# Patient Record
Sex: MALE | Race: WHITE | NOT HISPANIC OR LATINO | Employment: FULL TIME | URBAN - METROPOLITAN AREA
[De-identification: names, ages, dates, MRNs, and addresses within clinical notes are randomized per-mention and may not be internally consistent; named-entity substitution may affect disease eponyms.]

---

## 2017-01-25 ENCOUNTER — TELEPHONE (OUTPATIENT)
Dept: FAMILY MEDICINE | Age: 32
End: 2017-01-25

## 2017-01-25 ENCOUNTER — IMAGING SERVICES (OUTPATIENT)
Dept: GENERAL RADIOLOGY | Age: 32
End: 2017-01-25
Attending: FAMILY MEDICINE

## 2017-01-25 ENCOUNTER — OFFICE VISIT (OUTPATIENT)
Dept: FAMILY MEDICINE | Age: 32
End: 2017-01-25

## 2017-01-25 VITALS
SYSTOLIC BLOOD PRESSURE: 110 MMHG | TEMPERATURE: 97.7 F | BODY MASS INDEX: 30.52 KG/M2 | DIASTOLIC BLOOD PRESSURE: 80 MMHG | HEART RATE: 84 BPM | HEIGHT: 73 IN | WEIGHT: 230.3 LBS

## 2017-01-25 DIAGNOSIS — Z23 NEED FOR VACCINATION: ICD-10-CM

## 2017-01-25 DIAGNOSIS — R10.9 ABDOMINAL DISCOMFORT: Primary | ICD-10-CM

## 2017-01-25 DIAGNOSIS — R10.9 ABDOMINAL DISCOMFORT: ICD-10-CM

## 2017-01-25 PROCEDURE — 90688 IIV4 VACCINE SPLT 0.5 ML IM: CPT | Performed by: FAMILY MEDICINE

## 2017-01-25 PROCEDURE — 99214 OFFICE O/P EST MOD 30 MIN: CPT | Performed by: FAMILY MEDICINE

## 2017-01-25 PROCEDURE — 74000 XR ABDOMEN 1 VW KUB SUPINE: CPT | Performed by: RADIOLOGY

## 2017-01-25 PROCEDURE — 90471 IMMUNIZATION ADMIN: CPT | Performed by: FAMILY MEDICINE

## 2017-01-25 RX ORDER — PANTOPRAZOLE SODIUM 40 MG/1
40 TABLET, DELAYED RELEASE ORAL DAILY
Qty: 30 TABLET | Refills: 0 | Status: SHIPPED | OUTPATIENT
Start: 2017-01-25

## 2017-01-25 RX ORDER — POLYETHYLENE GLYCOL 3350 17 G/17G
17 POWDER, FOR SOLUTION ORAL DAILY
Qty: 527 G | Refills: 1 | Status: SHIPPED | OUTPATIENT
Start: 2017-01-25

## 2020-09-06 ENCOUNTER — APPOINTMENT (OUTPATIENT)
Dept: LAB | Facility: CLINIC | Age: 35
End: 2020-09-06
Payer: COMMERCIAL

## 2022-12-24 ENCOUNTER — HEALTH MAINTENANCE LETTER (OUTPATIENT)
Age: 37
End: 2022-12-24

## 2023-04-15 ENCOUNTER — HEALTH MAINTENANCE LETTER (OUTPATIENT)
Age: 38
End: 2023-04-15

## 2023-04-20 PROBLEM — M51.26 HERNIATION OF LUMBAR INTERVERTEBRAL DISC WITHOUT MYELOPATHY: Status: ACTIVE | Noted: 2023-04-20

## 2023-04-20 RX ORDER — EMTRICITABINE AND TENOFOVIR ALAFENAMIDE 200; 25 MG/1; MG/1
1 TABLET ORAL
COMMUNITY
Start: 2020-07-14 | End: 2023-04-21

## 2023-04-20 RX ORDER — SODIUM FLUORIDE 6.1 MG/ML
PASTE, DENTIFRICE DENTAL
COMMUNITY
Start: 2022-08-31 | End: 2023-04-21

## 2023-04-21 ENCOUNTER — OFFICE VISIT (OUTPATIENT)
Dept: FAMILY MEDICINE | Facility: CLINIC | Age: 38
End: 2023-04-21
Payer: COMMERCIAL

## 2023-04-21 VITALS
HEART RATE: 57 BPM | BODY MASS INDEX: 31.7 KG/M2 | WEIGHT: 239.2 LBS | SYSTOLIC BLOOD PRESSURE: 109 MMHG | OXYGEN SATURATION: 97 % | RESPIRATION RATE: 16 BRPM | DIASTOLIC BLOOD PRESSURE: 68 MMHG | HEIGHT: 73 IN

## 2023-04-21 DIAGNOSIS — Z76.89 ENCOUNTER TO ESTABLISH CARE: ICD-10-CM

## 2023-04-21 DIAGNOSIS — N50.89 MASS OF LEFT TESTICLE: ICD-10-CM

## 2023-04-21 DIAGNOSIS — E80.4 GILBERT SYNDROME: ICD-10-CM

## 2023-04-21 DIAGNOSIS — R53.83 FATIGUE, UNSPECIFIED TYPE: ICD-10-CM

## 2023-04-21 DIAGNOSIS — Z13.220 SCREENING FOR LIPID DISORDERS: ICD-10-CM

## 2023-04-21 DIAGNOSIS — Z71.89 ADVANCE CARE PLANNING: ICD-10-CM

## 2023-04-21 DIAGNOSIS — Z00.00 ROUTINE GENERAL MEDICAL EXAMINATION AT A HEALTH CARE FACILITY: Primary | ICD-10-CM

## 2023-04-21 LAB
BILIRUB DIRECT SERPL-MCNC: 0.33 MG/DL (ref 0–0.3)
BILIRUB SERPL-MCNC: 2.1 MG/DL
CHOLEST SERPL-MCNC: 207 MG/DL
HDLC SERPL-MCNC: 41 MG/DL
LDLC SERPL CALC-MCNC: 141 MG/DL
NONHDLC SERPL-MCNC: 166 MG/DL
T4 FREE SERPL-MCNC: 1.02 NG/DL (ref 0.9–1.7)
TRIGL SERPL-MCNC: 127 MG/DL
TSH SERPL DL<=0.005 MIU/L-ACNC: 4.91 UIU/ML (ref 0.3–4.2)

## 2023-04-21 PROCEDURE — 84443 ASSAY THYROID STIM HORMONE: CPT | Performed by: STUDENT IN AN ORGANIZED HEALTH CARE EDUCATION/TRAINING PROGRAM

## 2023-04-21 PROCEDURE — 82247 BILIRUBIN TOTAL: CPT | Performed by: STUDENT IN AN ORGANIZED HEALTH CARE EDUCATION/TRAINING PROGRAM

## 2023-04-21 PROCEDURE — 80061 LIPID PANEL: CPT | Performed by: STUDENT IN AN ORGANIZED HEALTH CARE EDUCATION/TRAINING PROGRAM

## 2023-04-21 PROCEDURE — 84439 ASSAY OF FREE THYROXINE: CPT | Performed by: STUDENT IN AN ORGANIZED HEALTH CARE EDUCATION/TRAINING PROGRAM

## 2023-04-21 PROCEDURE — 36415 COLL VENOUS BLD VENIPUNCTURE: CPT | Performed by: STUDENT IN AN ORGANIZED HEALTH CARE EDUCATION/TRAINING PROGRAM

## 2023-04-21 PROCEDURE — 82306 VITAMIN D 25 HYDROXY: CPT | Performed by: STUDENT IN AN ORGANIZED HEALTH CARE EDUCATION/TRAINING PROGRAM

## 2023-04-21 PROCEDURE — 99213 OFFICE O/P EST LOW 20 MIN: CPT | Mod: 25 | Performed by: STUDENT IN AN ORGANIZED HEALTH CARE EDUCATION/TRAINING PROGRAM

## 2023-04-21 PROCEDURE — 99385 PREV VISIT NEW AGE 18-39: CPT | Performed by: STUDENT IN AN ORGANIZED HEALTH CARE EDUCATION/TRAINING PROGRAM

## 2023-04-21 PROCEDURE — 82248 BILIRUBIN DIRECT: CPT | Performed by: STUDENT IN AN ORGANIZED HEALTH CARE EDUCATION/TRAINING PROGRAM

## 2023-04-21 NOTE — PROGRESS NOTES
SUBJECTIVE:   CC: Saúl is an 38 year old who presents for preventative health visit.       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 (For example, a Health Directive, POLST, or a discussion with a medical provider or your loved ones about your wishes): no ACD, given honoring choices    Social History     Tobacco Use     Smoking status: Never     Smokeless tobacco: Never   Vaping Use     Vaping status: Never Used   Substance Use Topics     Alcohol use: No              View : No data to display.                Last PSA: No results found for: PSA    Reviewed orders with patient. Reviewed health maintenance and updated orders accordingly - Yes    Reviewed and updated as needed this visit by clinical staff   Tobacco  Allergies  Meds  Problems  Med Hx  Surg Hx  Fam Hx  Soc   Hx        Reviewed and updated as needed this visit by Provider   Tobacco  Allergies  Meds  Problems  Med Hx  Surg Hx  Fam Hx  Soc   Hx       Past Medical History:   Diagnosis Date     NO ACTIVE PROBLEMS     abstract      Past Surgical History:   Procedure Laterality Date     NO HISTORY OF SURGERY      abstract       Review of Systems  CONSTITUTIONAL: NEGATIVE for fever, chills, change in weight. POSITIVE for fatigue  INTEGUMENTARY/SKIN: NEGATIVE for worrisome rashes, moles or lesions  EYES: NEGATIVE for vision changes or irritation  ENT: NEGATIVE for ear, mouth and throat problems  RESP: NEGATIVE for significant cough or SOB  CV: NEGATIVE for chest pain, palpitations  "or peripheral edema  GI: NEGATIVE for nausea, abdominal pain, heartburn, or change in bowel habits   male: negative for dysuria, hematuria, decreased urinary stream, erectile dysfunction, urethral discharge  MUSCULOSKELETAL: NEGATIVE for significant arthralgias or myalgia  NEURO: NEGATIVE for weakness, dizziness or paresthesias  PSYCHIATRIC: NEGATIVE for changes in mood or affect    OBJECTIVE:   /68   Pulse 57   Resp 16   Ht 1.86 m (6' 1.23\")   Wt 108.5 kg (239 lb 3.2 oz)   SpO2 97%   BMI 31.36 kg/m      Physical Exam  GENERAL: healthy, alert and no distress  EYES: Eyes grossly normal to inspection, PERRL and conjunctivae and sclerae normal  HENT: ear canals and TM's normal, nose and mouth without ulcers or lesions  NECK: no adenopathy, no asymmetry, masses, or scars and thyroid normal to palpation  RESP: lungs clear to auscultation - no rales, rhonchi or wheezes  CV: regular rate and rhythm, normal S1 S2, no S3 or S4, no murmur, click or rub, no peripheral edema and peripheral pulses strong  ABDOMEN: soft, nontender, no hepatosplenomegaly, no masses and bowel sounds normal  MS: no gross musculoskeletal defects noted, no edema  SKIN: no suspicious lesions or rashes  NEURO: Normal strength and tone, mentation intact and speech normal  PSYCH: mentation appears normal, affect normal/bright    ASSESSMENT/PLAN:   Graeme was seen today for new patient and physical.    Diagnoses and all orders for this visit:    Routine general medical examination at a health care facility  Encounter to establish care  See counseling below    Screening for lipid disorders  -     Lipid panel; Future  -     Lipid panel  Screen today    Gilbert syndrome  -     Bilirubin direct; Future  -     Bilirubin  total; Future  -     Bilirubin direct  -     Bilirubin  total  Known hx. Has been stable. Will monitor bili periodically.     Mass of left testicle  Did not examine today- pt reports unchanged. No pain currently. Does not affect " "sexual function.    Fatigue, unspecified type  -     Vitamin D Level; Future  -     TSH with free T4 reflex; Future  -     Vitamin D Level  -     TSH with free T4 reflex  Unspecified fatigue. No hx of anemia and has good nutrition. Likely related to co- occuring condition of depression, but will check TSH and Vit D as these are easy to intervene upon.     Advance care planning  Given [place] honoring choices ACD to fill out and return        Patient has been advised of split billing requirements and indicates understanding: Yes      COUNSELING:   Reviewed preventive health counseling, as reflected in patient instructions  Special attention given to:        Regular exercise       Healthy diet/nutrition       Vision screening       Alcohol Use        Colorectal cancer screening       Prostate cancer screening       Advance Care Planning    Prostate screening in 50s 2/2 fam hx  CRC at age 45.     BMI:   Estimated body mass index is 31.36 kg/m  as calculated from the following:    Height as of this encounter: 1.86 m (6' 1.23\").    Weight as of this encounter: 108.5 kg (239 lb 3.2 oz).   Weight management plan: Discussed healthy diet and exercise guidelines      He reports that he has never smoked. He has never used smokeless tobacco.    Larry Thapa DO  [facility]  "

## 2023-04-22 LAB — DEPRECATED CALCIDIOL+CALCIFEROL SERPL-MC: 38 UG/L (ref 20–75)

## 2023-09-01 ENCOUNTER — E-VISIT (OUTPATIENT)
Dept: URGENT CARE | Facility: CLINIC | Age: 38
End: 2023-09-01
Payer: COMMERCIAL

## 2023-09-01 ENCOUNTER — TELEPHONE (OUTPATIENT)
Dept: FAMILY MEDICINE | Facility: CLINIC | Age: 38
End: 2023-09-01

## 2023-09-01 DIAGNOSIS — U07.1 INFECTION DUE TO 2019 NOVEL CORONAVIRUS: Primary | ICD-10-CM

## 2023-09-01 PROCEDURE — 99421 OL DIG E/M SVC 5-10 MIN: CPT | Performed by: PHYSICIAN ASSISTANT

## 2023-09-01 NOTE — TELEPHONE ENCOUNTER
RN COVID TREATMENT VISIT  09/01/23      The patient has been triaged and does not require a higher level of care.    Graeme Gamez  38 year old  Current weight? 239    Has the patient been seen by a primary care provider at an [facility] or [facility] within the past two years? Yes.   Have you been in close proximity to/do you have a known exposure to a person with a confirmed case of influenza? No.     General treatment eligibility:  Date of positive COVID test (PCR or at home)?  08/29/2023    Are you or have you been hospitalized for this COVID-19 infection? No.   Have you received monoclonal antibodies or antiviral treatment for COVID-19 since this positive test? No.   Do you have any of the following conditions that place you at risk of being very sick from COVID-19?   - Overweight or Obesity (BMI >85th percentile or BMI 25 or higher)  Yes, patient has at least one high risk condition as noted above.     Current COVID symptoms:   - cough  - headache  - sore throat  - congestion or runny nose  Yes. Patient has at least one symptom as selected.     How many days since symptoms started? 5 days or less. Established patient, 12 years or older weighing at least 88.2 lbs, who has symptoms that started in the past 5 days, has not been hospitalized nor received treatment already, and is at risk for being very sick from COVID-19.     Treatment eligibility by RN:  Are you currently pregnant or nursing? No  Do you have a clinically significant hypersensitivity to nirmatrelvir or ritonavir, or toxic epidermal necrolysis (TEN) or Grimaldo-Maurice Syndrome? No  Do you have a history of hepatitis, any hepatic impairment on the Problem List (such as Child-Ziegler Class C, cirrhosis, fatty liver disease, alcoholic liver disease), or was the last liver lab (hepatic panel, ALT, AST, ALK Phos, bilirubin) elevated in the past 6 months? No  Do you have any history of severe renal impairment (eGFR <  30mL/min)? No    Is patient eligible to continue? Yes, patient meets all eligibility requirements for the RN COVID treatment (as denoted by all no responses above).     No current outpatient medications on file.       Medications from List 1 of the standing order (on medications that exclude the use of Paxlovid) that patient is taking: NONE. Is patient taking Sindy's Wort? No  Is patient taking Sindy's Wort or any meds from List 1? No.   Medications from List 2 of the standing order (on meds that provider needs to adjust) that patient is taking: NONE. Is patient on any of the meds from List 2? No.   Medications from List 3 of standing order (on meds that a RN needs to adjust) that patient is taking: NONE. Is patient on any meds from List 3? No.     Paxlovid has an approximate 90% reduction in hospitalization. Paxlovid can possibly cause altered sense of taste, diarrhea (loose, watery stools), high blood pressure, muscle aches.     Would patient like a Paxlovid prescription?   Yes.   No results found for: GFRESTIMATED    Was last eGFR reduced? No, eGFR 60 or greater/ No Result on record. Patient can receive the normal renal function dose. Paxlovid Rx sent to [facility], [place]    Temporary change to home medications: None    All medication adjustments (holds, etc) were discussed with the patient and patient was asked to repeat back (teachback) their med adjustment.  Did patient understand med adjustment? No medication adjustments needed.         Reviewed the following instructions with the patient:    Paxlovid (nimatrelvir and ritonavir)    How it works  Two medicines (nirmatrelvir and ritonavir) are taken together. They stop the virus from growing. Less amount of virus is easier for your body to fight.    How to take  Medicine comes in a daily container with both medicine tablets. Take by mouth twice daily (once in the morning, once at night) for 5 days.  The number of tablets to take varies by  patient.  Don't chew or break capsules. Swallow whole.    When to take  Take as soon as possible after positive COVID-19 test result, and within 5 days of your first symptoms.    Possible side effects  Can cause altered sense of taste, diarrhea (loose, watery stools), high blood pressure, muscle aches.    Rozina Ortiz RN

## 2023-09-01 NOTE — PATIENT INSTRUCTIONS
"Graeme Gamez,    You tested positive for COVID-19. A PCR test is not needed, a home test is enough to be considered for COVID treatment. However, a  treatment visit is needed to discuss COVID treatments and the best option for you. Please schedule a virtual visit for COVID treatment. Log in to your [facility] and under the main menu, select \"schedule an appointment\" then \"COVID-19 treatment\" to make an appointment. You can also call [phone] and say \"COVID\" at the prompt.    You will not be charged for this e visit today.    Jolene Bowling PA-C  [facility]    "

## 2023-09-01 NOTE — TELEPHONE ENCOUNTER
COVID Positive/Requesting COVID treatment    Patient is positive for COVID and requesting treatment options.    Date of positive COVID test (PCR or at home)? 08/29/23 at home test    Current COVID symptoms: Sore throat, cough, heavy congestion    Date COVID symptoms began: 08/29/23    Message should be routed to clinic RN pool. Best phone number to use for call back: [phone]

## 2024-02-02 ENCOUNTER — OFFICE VISIT (OUTPATIENT)
Dept: FAMILY MEDICINE | Facility: CLINIC | Age: 39
End: 2024-02-02
Payer: COMMERCIAL

## 2024-02-02 VITALS
HEART RATE: 62 BPM | RESPIRATION RATE: 20 BRPM | WEIGHT: 235.6 LBS | SYSTOLIC BLOOD PRESSURE: 109 MMHG | DIASTOLIC BLOOD PRESSURE: 73 MMHG | HEIGHT: 72 IN | BODY MASS INDEX: 31.91 KG/M2 | OXYGEN SATURATION: 98 % | TEMPERATURE: 97.8 F

## 2024-02-02 DIAGNOSIS — R00.2 PALPITATIONS: Primary | ICD-10-CM

## 2024-02-02 DIAGNOSIS — R53.83 FATIGUE, UNSPECIFIED TYPE: ICD-10-CM

## 2024-02-02 DIAGNOSIS — F41.9 ANXIETY: ICD-10-CM

## 2024-02-02 LAB
ERYTHROCYTE [DISTWIDTH] IN BLOOD BY AUTOMATED COUNT: 11.9 % (ref 10–15)
HCT VFR BLD AUTO: 44.3 % (ref 40–53)
HGB BLD-MCNC: 15.8 G/DL (ref 13.3–17.7)
HOLD SPECIMEN: NORMAL
MCH RBC QN AUTO: 30.6 PG (ref 26.5–33)
MCHC RBC AUTO-ENTMCNC: 35.7 G/DL (ref 31.5–36.5)
MCV RBC AUTO: 86 FL (ref 78–100)
PLATELET # BLD AUTO: 207 10E3/UL (ref 150–450)
RBC # BLD AUTO: 5.17 10E6/UL (ref 4.4–5.9)
WBC # BLD AUTO: 6.1 10E3/UL (ref 4–11)

## 2024-02-02 PROCEDURE — 84443 ASSAY THYROID STIM HORMONE: CPT

## 2024-02-02 PROCEDURE — 99214 OFFICE O/P EST MOD 30 MIN: CPT | Mod: GC

## 2024-02-02 PROCEDURE — 36415 COLL VENOUS BLD VENIPUNCTURE: CPT

## 2024-02-02 PROCEDURE — 85027 COMPLETE CBC AUTOMATED: CPT

## 2024-02-02 PROCEDURE — 93000 ELECTROCARDIOGRAM COMPLETE: CPT | Mod: GC

## 2024-02-02 RX ORDER — HYDROXYZINE HYDROCHLORIDE 10 MG/1
10-50 TABLET, FILM COATED ORAL DAILY PRN
Qty: 90 TABLET | Refills: 3 | Status: SHIPPED | OUTPATIENT
Start: 2024-02-02

## 2024-02-02 NOTE — PROGRESS NOTES
1. Palpitations  Chronic intermittent episodes associated with chest tightness and discomfort. Not related to exertion. No neurologic or respiratory symptoms. History of anxiety. See HPI for more details. Differential quite broad with anxiety/panic attacks currently most likely. Normal EKG today reassuring against need for ED evaluation. Will repeat thyroid labs and treat anxiety. Follow up.  - EKG 12-lead complete w/read - sinus rhythm without tachycardia or evidence of structural abnormalities  - TSH with free T4 reflex; Future  - CBC with Platelets and Reflex to Iron Studies; Future  - Will start hydroxyzine for sleep assistance and palpitations    2. Fatigue, unspecified type  Has dealt with fatigue most of his life. Takes daily naps. Does not wake up in mornings feeling rested. TSH elevated April 2023, normal T4 at that time. No observed loud snoring or gasping for air at night. No HTN. KD, hypothyroidism, anemia all on the differential.   - TSH with free T4 reflex; Future  - Patient would like to hold off on obtaining a sleep study at this time        America Hays is a 39 year old, presenting for the following health issues:  Fatigue, Palpitations (Intermittent throughout the day. He states it happens most at night when trying to sleep and have lasted the entire night. He states he feel like he is having a panic attack. ), and Follow Up (Thyroid )        2/2/2024     1:55 PM   Additional Questions   Roomed by Getachew   Accompanied by Self     HPI   Intermittent palpitations  - years of occasional heart palpitations  - palpitations did not used to bother him, but are becoming more symptomatic and frequent  - episodes most frequently occur before bed, most recent episode was last night  - episodes associated with chest tightness, chest pain that lingers after the episodes subside  - feels his body is heavy and weak during episodes  - no vision changes, headaches, loss of consciousness, or sudden focal  weakness   - no SOB  - C4 energy drinks 3-4 days per week, does not notice an association with the palpitations and the timing of energy drinks  - omega-3 fatty acid, B12, multivitamin supplements  - mom thyroid condition (patient unsure if hyper or hypo)  - heart disease in maternal uncles  - note in care everywhere from 2021 mentions heart attack in mother  - history of anxiety, was on meds long ago, does not take any now  - sees therapist regularly    Chronic fatigue  - years of fatigue  - has always considered himself a tired person  - typically takes 1 nap per day, often wakes up tired, has always been a tired person during the day  - no fevers, unusually weight changes, or constipation  - no witnessed snoring or gasping for breath from partner  - edible cannabis occasionally for sleep   - no history of anemia  - has Gilbert syndrome that has always been asymptomatic      Review of Systems  Constitutional, HEENT, cardiovascular, pulmonary, gi and gu systems are negative, except as otherwise noted.        Objective    /73   Pulse 62   Temp 97.8  F (36.6  C) (Oral)   Resp 20   Ht 1.829 m (6')   Wt 106.9 kg (235 lb 9.6 oz)   SpO2 98%   BMI 31.95 kg/m    Body mass index is 31.95 kg/m .    EKG Interpretation:  By Luiza Oneil MD    Indication: palpitations  Symptoms at time of EKG: None   Interpretation: appears normal, NSR, normal axis, normal intervals, no acute ST/T changes c/w ischemia, no LVH by voltage criteria  Comparison with previous EKGs:Previous exam unavailable    Patient informed at visit.    Physical Exam  Constitutional:       General: He is not in acute distress.     Appearance: Normal appearance.   HENT:      Head: Normocephalic and atraumatic.      Nose: Nose normal.      Mouth/Throat:      Mouth: Mucous membranes are moist.      Pharynx: Oropharynx is clear. No oropharyngeal exudate.   Eyes:      Extraocular Movements: Extraocular movements intact.      Conjunctiva/sclera: Conjunctivae  normal.      Pupils: Pupils are equal, round, and reactive to light.   Cardiovascular:      Rate and Rhythm: Normal rate and regular rhythm.      Pulses: Normal pulses.      Heart sounds: Normal heart sounds.   Pulmonary:      Effort: Pulmonary effort is normal. No respiratory distress.      Breath sounds: Normal breath sounds. No wheezing.   Skin:     General: Skin is warm and dry.   Neurological:      General: No focal deficit present.      Mental Status: He is alert.   Psychiatric:         Mood and Affect: Mood normal.         Bryan Simons, MS3  [employer]      Resident/Fellow Attestation   I, Luiza Oneil MD, was present with the medical/CARMEN student who participated in the service and in the documentation of the note.  I have verified the history and personally performed the physical exam and medical decision making.  I agree with the assessment and plan of care as documented in the note.      Luiza Oneil MD  PGY2

## 2024-02-02 NOTE — PROGRESS NOTES
Preceptor Attestation:    I discussed the patient with the resident and evaluated the patient in person. I personally viewed the EKG and agree with the interpretation documented by the resident. I have verified the content of the note, which accurately reflects my assessment of the patient and the plan of care.   Supervising Physician:  Dashawn Guadalupe MD, MD.

## 2024-02-03 LAB — TSH SERPL DL<=0.005 MIU/L-ACNC: 3.03 UIU/ML (ref 0.3–4.2)

## 2024-02-07 NOTE — PATIENT INSTRUCTIONS
Patient Education   Start:  - hydrOXYzine HCl (ATARAX) 10 MG tablet; Take 1-5 tablets (10-50 mg) by mouth daily as needed for anxiety  Dispense: 90 tablet; Refill: 3          Please call or return to clinic if your symptoms don't go away.      Thank you for coming to [facility] today.  Lab Testing:  **If you had lab testing today and your results are reassuring or normal they will be mailed to you or sent through [employer] within 7 days.   **If the lab tests need quick action we will call you with the results.  **If you are having labs done on a different day, please call [phone] to schedule at [facility] or [phone] for other [facility]patient [facility] locations. Labs do not offer walk-in appointments.  The phone number we will call with results is # [phone] (home) . If this is not the best number please call our clinic and change the number.  Medication Refills:  If you need any refills please call your pharmacy and they will contact us.   If you need to  your refill at a new pharmacy, please contact the new pharmacy directly. The new pharmacy will help you get your medications transferred faster.   Scheduling:  If you have any concerns about today's visit or wish to schedule another appointment please call our office during normal business hours [phone] (8-5:00 M-F). If you can no longer make a scheduled visit, please cancel via [employer] or call us to cancel.   If a referral was made to an [facility] specialty provider and you do not get a call from central scheduling, please refer to directions on your visit summary or call our office during normal business hours for assistance.   If a Mammogram was ordered for you at the [facility] call [phone] to schedule or change your appointment.  If you had an XRay/CT/Ultrasound/MRI ordered the number is [phone] to schedule or change your radiology appointment.   [facility] has limited ultrasound appointments  available on Wednesdays, if you would like your ultrasound at [facility], please call [phone] to schedule.   Medical Concerns:  If you have urgent medical concerns please call [phone] at any time of the day.    Luiza Oneil MD

## 2024-06-16 ENCOUNTER — HEALTH MAINTENANCE LETTER (OUTPATIENT)
Age: 39
End: 2024-06-16

## 2024-09-29 ENCOUNTER — NURSE TRIAGE (OUTPATIENT)
Dept: NURSING | Facility: CLINIC | Age: 39
End: 2024-09-29
Payer: COMMERCIAL

## 2024-09-29 ENCOUNTER — APPOINTMENT (OUTPATIENT)
Dept: ULTRASOUND IMAGING | Facility: CLINIC | Age: 39
End: 2024-09-29
Attending: EMERGENCY MEDICINE
Payer: COMMERCIAL

## 2024-09-29 ENCOUNTER — TELEPHONE (OUTPATIENT)
Dept: NURSING | Facility: CLINIC | Age: 39
End: 2024-09-29
Payer: COMMERCIAL

## 2024-09-29 ENCOUNTER — HOSPITAL ENCOUNTER (EMERGENCY)
Facility: CLINIC | Age: 39
Discharge: HOME | End: 2024-09-29
Attending: EMERGENCY MEDICINE | Admitting: EMERGENCY MEDICINE
Payer: COMMERCIAL

## 2024-09-29 VITALS
SYSTOLIC BLOOD PRESSURE: 126 MMHG | HEART RATE: 73 BPM | HEIGHT: 72 IN | BODY MASS INDEX: 31.15 KG/M2 | OXYGEN SATURATION: 99 % | RESPIRATION RATE: 18 BRPM | DIASTOLIC BLOOD PRESSURE: 73 MMHG | WEIGHT: 230 LBS | TEMPERATURE: 97.2 F

## 2024-09-29 DIAGNOSIS — I82.4Y1 ACUTE DEEP VEIN THROMBOSIS (DVT) OF PROXIMAL VEIN OF RIGHT LOWER EXTREMITY (H): ICD-10-CM

## 2024-09-29 PROCEDURE — 250N000013 HC RX MED GY IP 250 OP 250 PS 637: Performed by: EMERGENCY MEDICINE

## 2024-09-29 PROCEDURE — 99284 EMERGENCY DEPT VISIT MOD MDM: CPT | Mod: 25

## 2024-09-29 PROCEDURE — 93971 EXTREMITY STUDY: CPT | Mod: RT

## 2024-09-29 RX ADMIN — RIVAROXABAN 15 MG: 15 TABLET, FILM COATED ORAL at 13:24

## 2024-09-29 ASSESSMENT — ACTIVITIES OF DAILY LIVING (ADL)
ADLS_ACUITY_SCORE: 35
ADLS_ACUITY_SCORE: 35

## 2024-09-29 ASSESSMENT — COLUMBIA-SUICIDE SEVERITY RATING SCALE - C-SSRS
6. HAVE YOU EVER DONE ANYTHING, STARTED TO DO ANYTHING, OR PREPARED TO DO ANYTHING TO END YOUR LIFE?: NO
1. IN THE PAST MONTH, HAVE YOU WISHED YOU WERE DEAD OR WISHED YOU COULD GO TO SLEEP AND NOT WAKE UP?: NO
2. HAVE YOU ACTUALLY HAD ANY THOUGHTS OF KILLING YOURSELF IN THE PAST MONTH?: NO

## 2024-09-29 NOTE — DISCHARGE INSTRUCTIONS
As discussed, take the prescribed Xarelto, getting your second dose on board this evening.  Call your orthopedist tomorrow to let them know about your blood clot in your calf as this may change the plan for surgery.  Present to the ER immediately if you develop any bleeding, strike your head, or suffer other significant trauma.

## 2024-09-29 NOTE — ED PROVIDER NOTES
Emergency Department Note      History of Present Illness     Chief Complaint   Leg Swelling      HPI   Graeme Gamez is a 39 year old male who presents with a complaint of leg swelling. The patient injured his right knee on 09/15 and has an ACL tear, following playing kickball. He has surgery scheduled for 10/17 with [facility]. He reports that for the last few days, he has noticed increased swelling in his lower right extremity. Friday morning, he started to notice more pain. He also notes some fullness and tingling in the right foot, as well as some intermittent discoloration. He denies any pain or swelling above his right knee. He has been trying to bear weight on the leg with crutches. The edema and pain do not improve when he uses the leg, but the discoloration does.    Independent Historian   None    Review of External Notes   I reviewed Urgent Care visit from earlier today. Ultrasound was not available and patient was referred here for further evaluation.   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  Extraocular movements intact.    ENT:  No rhinorrhea.  Moist mucus membranes.  Normal tongue and tonsil.    Musculoskeletal:  Normal movement of all extremities without evidence for deficit. There is asymmetric swelling of the right lower leg without redness or warmth.     Skin:  Warm and dry without rashes.    Neurologic:  Non-focal exam without asymmetric weakness or numbness.     Psychiatric:  Normal affect with appropriate interaction with  "examiner.     Diagnostics     Lab Results   None    Imaging   US Lower Extremity Venous Duplex Right   Final Result   IMPRESSION:   1.  DVT limited to the right calf.      Results called to Dr. Trierweiler at time of dictation.          EKG   None    Independent Interpretation   None    ED Course      Medications Administered   Medications   rivaroxaban ANTICOAGULANT (XARELTO) tablet 15 mg (15 mg Oral $Given 9/29/24 1324)       Procedures   Procedures     Discussion of Management   155 I spoke to radiology.    ED Course   ED Course as of 09/29/24 1843   Sun Sep 29, 2024   1140 I initially assessed the patient and obtained the history and physical exam.    1302 I rechecked and updated the patient.    1308 I believe it is safe to discharge the patient. The patient agrees.        Additional Documentation  None    Medical Decision Making / Diagnosis     CMS Diagnoses: None    MIPS       None    MDM   Graeme Gamez is a 39 year old male presents to us with progressive swelling of his right calf over the last several days in the setting of having a known torn ACL.  Unfortunately, ultrasound is positive for DVT in 2 of the deep vessels of his calf.  There are no contraindications to initiate Xarelto.  However, I did advise him to speak with his orthopedist tomorrow to discuss how this may impact his surgery and recovery.    Addendum: The patient was unable to  his Xarelto at the pharmacy of his choice.  He return to the ER and I tasked my pharmacist with finding a local pharmacy that has access to Xarelto for him.  I have written for a 3-week supply as no one has the \"starter pack.\"  The patient was advised earlier that he will need to follow-up with his primary team for ongoing anticoagulants.    Disposition   The patient was discharged.     Diagnosis     ICD-10-CM    1. Acute deep vein thrombosis (DVT) of proximal vein of right lower extremity (H)  I82.4Y1            Discharge Medications   Discharge Medication " List as of 9/29/2024  1:18 PM        START taking these medications    Details   Rivaroxaban ANTICOAGULANT 15 & 20 MG TBPK Starter Therapy Pack Take 15 mg by mouth 2 times daily (with meals) for 21 days, THEN 20 mg daily with food for 9 days., Disp-51 each, R-0, E-Prescribe               Scribe Disclosure:  I, Simran Higgins, am serving as a scribe at 11:32 AM on 9/29/2024 to document services personally performed by Trierweiler, Chad A, MD based on my observations and the provider's statements to me.        Trierweiler, Chad A, MD  09/29/24 1403

## 2024-09-29 NOTE — ED TRIAGE NOTES
Patient injured his right knee on 9/15 and has a ACL tear. Patient had been toe touch weight bearing surgery next month. Patient has noticed over the last couple days pain and swelling in right leg, concerned for blood clot. Denies any new injuries, is currently taking a 81mg ASA.     Triage Assessment (Adult)       Row Name 09/29/24 1127          Triage Assessment    Airway WDL WDL        Respiratory WDL    Respiratory WDL WDL        Skin Circulation/Temperature WDL    Skin Circulation/Temperature WDL X  swelling to right lower leg        Cardiac WDL    Cardiac WDL WDL        Peripheral/Neurovascular WDL    Peripheral Neurovascular WDL WDL        Cognitive/Neuro/Behavioral WDL    Cognitive/Neuro/Behavioral WDL WDL

## 2024-09-29 NOTE — TELEPHONE ENCOUNTER
In ER earlier today. Found blood clot and supposed to take two blood thinners needed today. Nothing in stock at the pharmacy until tomorrow. Can he wait until tomorrow? Can the hospital pharmacy fill it? I connected him to the ED result team: [phone].  Netta Otero RN  [facility]     Reason for Disposition   [1] Follow-up call to recent contact AND [2] information only call, no triage required    Additional Information   Negative: [1] Caller is not with the adult (patient) AND [2] reporting urgent symptoms   Negative: Lab result questions   Negative: Medication questions   Negative: Caller can't be reached by phone   Negative: Caller has already spoken to PCP or another triager   Negative: RN needs further essential information from caller in order to complete triage   Negative: Requesting regular office appointment   Negative: [1] Caller requesting NON-URGENT health information AND [2] PCP's office is the best resource   Negative: Question about upcoming scheduled test, no triage required and triager able to answer question   Negative: [1] Caller is not with the adult (patient) AND [2] probable NON-URGENT symptoms   Negative: General information question, no triage required and triager able to answer question   Negative: Health Information question, no triage required and triager able to answer question    Protocols used: Information Only Call - [facility]

## 2024-09-29 NOTE — TELEPHONE ENCOUNTER
Patient prescribed xarelto at ED and pharmacy out of stock. Advised to check other [facility], which patient already did. Not something ED results RN is able to handle so advised return to ED. Patient verbalized understanding.     Juanpablo Esqueda RN  [facility]  Emergency Dept Lab Result RN  Ph# [phone]

## 2024-09-29 NOTE — TELEPHONE ENCOUNTER
Pt calling to find out if the [facility] has an ultrasound. Pt was advised that the [facility] do not have ultrasound; that the Emergency departments do.      He has an ACL injury and wants to check for a blood clot. He declined any other questions and disconnected.     Tuyet Mancera RN   [facility] on 9/29/2024 at 10:37 AM

## 2024-10-01 DIAGNOSIS — I82.409 DVT (DEEP VENOUS THROMBOSIS) (H): Primary | ICD-10-CM

## 2024-10-01 NOTE — PROGRESS NOTES
Rx provided to patient to start once he's done with the starter pack dosing. This Rx will get him to his visit with La WASHINGTONN, RN, PHN   [facility]   Office: [phone]  Fax: [phone]

## 2024-10-03 ENCOUNTER — DOCUMENTATION ONLY (OUTPATIENT)
Dept: ANTICOAGULATION | Facility: CLINIC | Age: 39
End: 2024-10-03
Payer: COMMERCIAL

## 2024-10-03 NOTE — PROGRESS NOTES
Anticoagulant Therapeutic Duplication    Duplicate orders identified: same medication but different dose, form, frequency or route    Duplicate orders appropriate-has starter pack order and ongoing therapy order     Active anticoagulant: rivaroxaban (Xarelto)    Plan made per [facility] anticoagulation protocol.    Obie Matos RN  10/3/2024

## 2024-10-04 ENCOUNTER — OFFICE VISIT (OUTPATIENT)
Dept: FAMILY MEDICINE | Facility: CLINIC | Age: 39
End: 2024-10-04
Payer: COMMERCIAL

## 2024-10-04 VITALS
HEIGHT: 72 IN | BODY MASS INDEX: 31.04 KG/M2 | SYSTOLIC BLOOD PRESSURE: 118 MMHG | OXYGEN SATURATION: 97 % | TEMPERATURE: 97 F | DIASTOLIC BLOOD PRESSURE: 81 MMHG | RESPIRATION RATE: 16 BRPM | HEART RATE: 89 BPM | WEIGHT: 229.2 LBS

## 2024-10-04 DIAGNOSIS — S83.511D RUPTURE OF ANTERIOR CRUCIATE LIGAMENT OF RIGHT KNEE, SUBSEQUENT ENCOUNTER: ICD-10-CM

## 2024-10-04 DIAGNOSIS — Z01.818 PRE-OP EVALUATION: Primary | ICD-10-CM

## 2024-10-04 DIAGNOSIS — I82.4Y1 ACUTE DEEP VEIN THROMBOSIS (DVT) OF PROXIMAL VEIN OF RIGHT LOWER EXTREMITY (H): ICD-10-CM

## 2024-10-04 PROCEDURE — 99214 OFFICE O/P EST MOD 30 MIN: CPT | Performed by: FAMILY MEDICINE

## 2024-10-04 PROCEDURE — G2211 COMPLEX E/M VISIT ADD ON: HCPCS | Performed by: FAMILY MEDICINE

## 2024-10-04 NOTE — PROGRESS NOTES
Preoperative Evaluation  [facility] [place]  [address]  [place] [address]  Phone: [phone]  Primary Provider: Larry Thapa,   Pre-op Performing Provider: Fredi Spears MD  Oct 4, 2024             9/29/2024   Surgical Information   What procedure is being done? ACL surgery   Facility or Hospital where procedure/surgery will be performed: [facility]   Who is doing the procedure / surgery? Orthopedic   Date of surgery / procedure: October 17   Time of surgery / procedure: Not sure yet   Where do you plan to recover after surgery? at home with family        Fax number for surgical facility:     Assessment & Plan     The proposed surgical procedure is considered INTERMEDIATE risk.      ICD-10-CM    1. Pre-op evaluation  Z01.818       2. Rupture of anterior cruciate ligament of right knee, subsequent encounter  S83.511D       3. Acute deep vein thrombosis (DVT) of proximal vein of right lower extremity (H)  I82.4Y1                The longitudinal plan of care for the diagnosis(es)/condition(s) as documented were addressed during this visit. Due to the added complexity in care, I will continue to support Saúl in the subsequent management and with ongoing continuity of care.     Risks and Recommendations  The patient has the following additional risks and recommendations for perioperative complications:   - No identified additional risk factors other than previously addressed    Antiplatelet or Anticoagulation Medication Instructions   - apixaban (Eliquis), edoxaban (Savaysa), rivaroxaban (Xarelto): Bleeding risk is low for this procedure AND CrCl (>=) 50 mL/min. DO NOT TAKE 1 day before surgery.      Additional Medication Instructions  Patient is on no additional chronic medications    Recommendation  Approval given to proceed with proposed procedure, without further diagnostic evaluation.    America Hays is a 39 year old, presenting for the following:  Pre-Op Exam           10/4/2024     1:52 PM   Additional Questions   Roomed by Tiara   Accompanied by Partner     HPI related to upcoming procedure: Patient presents for pre-op evaluation in anticipation for right acl repair.  Patient subsequently developed DVT of the right leg and is on xarelto        9/29/2024   Pre-Op Questionnaire   Have you ever had a heart attack or stroke? No   Have you ever had surgery on your heart or blood vessels, such as a stent placement, a coronary artery bypass, or surgery on an artery in your head, neck, heart, or legs? No   Do you have chest pain with activity? No   Do you have a history of heart failure? No   Do you currently have a cold, bronchitis or symptoms of other infection? No   Do you have a cough, shortness of breath, or wheezing? No   Do you or anyone in your family have previous history of blood clots? (!) UNKNOWN    Do you or does anyone in your family have a serious bleeding problem such as prolonged bleeding following surgeries or cuts? No   Have you ever had problems with anemia or been told to take iron pills? No   Have you had any abnormal blood loss such as black, tarry or bloody stools? No   Have you ever had a blood transfusion? No   Are you willing to have a blood transfusion if it is medically needed before, during, or after your surgery? Yes   Have you or any of your relatives ever had problems with anesthesia? No   Do you have sleep apnea, excessive snoring or daytime drowsiness? No   Do you have any artifical heart valves or other implanted medical devices like a pacemaker, defibrillator, or continuous glucose monitor? No   Do you have artificial joints? No   Are you allergic to latex? No        Health Care Directive  Patient does not have a Health Care Directive or Living Will: Discussed advance care planning with patient; however, patient declined at this time.    Preoperative Review of    reviewed - no record of controlled substances prescribed.      Status of Chronic  Conditions:  See problem list for active medical problems.  Problems all longstanding and stable, except as noted/documented.  See ROS for pertinent symptoms related to these conditions.    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 90 tablet 0    Rivaroxaban ANTICOAGULANT 15 & 20 MG TBPK Starter Therapy Pack Take 15 mg by mouth 2 times daily (with meals) for 21 days, THEN 20 mg daily with food for 9 days. 51 each 0    rivaroxaban ANTICOAGULANT (XARELTO) 15 MG TABS tablet Take 1 tablet (15 mg) by mouth 2 times daily. (Patient not taking: Reported on 10/4/2024) 42 tablet 0       Allergies   Allergen Reactions    Amoxicillin      rash    Penicillins         Social History     Tobacco Use    Smoking status: Never    Smokeless tobacco: Never   Substance Use Topics    Alcohol use: Yes     Comment: Not very often and very little when I do drink       History   Drug Use Unknown             Review of Systems  Constitutional, HEENT, cardiovascular, pulmonary, gi and gu systems are negative, except as otherwise noted.    Objective    /81   Pulse 89   Temp 97  F (36.1  C) (Temporal)   Resp 16   Ht 1.829 m (6')   Wt 104 kg (229 lb 3.2 oz)   SpO2 97%   BMI 31.09 kg/m     Estimated body mass index is  31.09 kg/m  as calculated from the following:    Height as of this encounter: 1.829 m (6').    Weight as of this encounter: 104 kg (229 lb 3.2 oz).  Physical Exam  Vitals reviewed.   Constitutional:       Appearance: Normal appearance. He is not ill-appearing.   HENT:      Head: Normocephalic.      Right Ear: Tympanic membrane and ear canal normal.      Left Ear: Tympanic membrane and ear canal normal.      Nose: Nose normal.   Eyes:      Extraocular Movements: Extraocular movements intact.   Cardiovascular:      Rate and Rhythm: Normal rate and regular rhythm.      Heart sounds: Normal heart sounds. No murmur heard.  Pulmonary:      Effort: Pulmonary effort is normal. No respiratory distress.      Breath sounds: Normal breath sounds. No wheezing or rales.   Abdominal:      Palpations: Abdomen is soft.   Musculoskeletal:         General: Normal range of motion.      Cervical back: Normal range of motion and neck supple.   Skin:     General: Skin is warm and dry.      Findings: No lesion.   Neurological:      Mental Status: He is alert and oriented to person, place, and time.   Psychiatric:         Mood and Affect: Mood normal.         Behavior: Behavior normal.         Thought Content: Thought content normal.         Judgment: Judgment normal.           Recent Labs   Lab Test 02/02/24  1607   HGB 15.8           Diagnostics  No labs were ordered during this visit.   No EKG required, no history of coronary heart disease, significant arrhythmia, peripheral arterial disease or other structural heart disease.    Revised Cardiac Risk Index (RCRI)  The patient has the following serious cardiovascular risks for perioperative complications:   - No serious cardiac risks = 0 points     RCRI Interpretation: 0 points: Class I (very low risk - 0.4% complication rate)         Signed Electronically by: Fredi Spears MD  A copy of this evaluation report is provided to the requesting physician.

## 2024-12-23 ENCOUNTER — TELEPHONE (OUTPATIENT)
Dept: FAMILY MEDICINE | Facility: CLINIC | Age: 39
End: 2024-12-23
Payer: COMMERCIAL

## 2024-12-23 NOTE — TELEPHONE ENCOUNTER
Sent patient a message that he will need to get form as we do not have.  Brianda Marcial   [facility]

## 2024-12-27 ENCOUNTER — ANCILLARY PROCEDURE (OUTPATIENT)
Dept: ULTRASOUND IMAGING | Facility: CLINIC | Age: 39
End: 2024-12-27
Attending: PHYSICIAN ASSISTANT
Payer: COMMERCIAL

## 2024-12-27 DIAGNOSIS — I82.4Y1 ACUTE DEEP VEIN THROMBOSIS (DVT) OF PROXIMAL VEIN OF RIGHT LOWER EXTREMITY (H): ICD-10-CM

## 2024-12-27 PROCEDURE — 93971 EXTREMITY STUDY: CPT | Mod: RT | Performed by: STUDENT IN AN ORGANIZED HEALTH CARE EDUCATION/TRAINING PROGRAM

## 2025-01-07 ENCOUNTER — VIRTUAL VISIT (OUTPATIENT)
Dept: FAMILY MEDICINE | Facility: CLINIC | Age: 40
End: 2025-01-07
Payer: COMMERCIAL

## 2025-01-07 DIAGNOSIS — S83.511D RUPTURE OF ANTERIOR CRUCIATE LIGAMENT OF RIGHT KNEE, SUBSEQUENT ENCOUNTER: Primary | ICD-10-CM

## 2025-01-07 PROCEDURE — 98006 SYNCH AUDIO-VIDEO EST MOD 30: CPT | Performed by: FAMILY MEDICINE

## 2025-01-07 NOTE — PROGRESS NOTES
Saúl is a 39 year old who is being evaluated via a billable video visit.    How would you like to obtain your AVS? MyChart  If the video visit is dropped, the invitation should be resent by: Text to cell phone: [phone]  Will anyone else be joining your video visit? No      Assessment & Plan       ICD-10-CM    1. Rupture of anterior cruciate ligament of right knee, subsequent encounter  S83.511D             A total of 30 minutes spent face-to-face with greater than 50% of the time spent in counseling and coordinating cares of the issues above   The longitudinal plan of care for the diagnosis(es)/condition(s) as documented were addressed during this visit. Due to the added complexity in care, I will continue to support Saúl in the subsequent management and with ongoing continuity of care.     BMI  Estimated body mass index is 31.95 kg/m  as calculated from the following:    Height as of 12/13/24: 1.829 m (6').    Weight as of 12/20/24: 106.9 kg (235 lb 9.6 oz).   Weight management plan: Discussed healthy diet and exercise guidelines      There are no Patient Instructions on file for this visit.    Subjective   Saúl is a 39 year old, presenting for the following health issues:  Forms (DMV)        1/7/2025     8:32 AM   Additional Questions   Roomed by Tiara   Accompanied by none         1/7/2025     8:32 AM   Patient Reported Additional Medications   Patient reports taking the following new medications none     History of Present Illness       Reason for visit:  To get a medical evaluation form to reinstate my license that needs to be sent to the DM[employer]    He eats 0-1 servings of fruits and vegetables daily.He consumes 0 sweetened beverage(s) daily.He exercises with enough effort to increase his heart rate 30 to 60 minutes per day.  He exercises with enough effort to increase his heart rate 5 days per week.   He is taking medications regularly.     Patient's 's license was revoked on January 5 assumed due to  error and handicap parking form.  Needs letter and/or paperwork for reinstatement.          Review of Systems  Constitutional, HEENT, cardiovascular, pulmonary, gi and gu systems are negative, except as otherwise noted.      Objective           Vitals:  No vitals were obtained today due to virtual visit.    Physical Exam   GENERAL: alert and no distress  EYES: Eyes grossly normal to inspection.  No discharge or erythema, or obvious scleral/conjunctival abnormalities.  RESP: No audible wheeze, cough, or visible cyanosis.    SKIN: Visible skin clear. No significant rash, abnormal pigmentation or lesions.  NEURO: Cranial nerves grossly intact.  Mentation and speech appropriate for age.  PSYCH: Appropriate affect, tone, and pace of words          Video-Visit Details    Type of service:  Video Visit   Originating Location (pt. Location): Home    Distant Location (provider location):  On-site  Platform used for Video Visit: Ann-Marie  Signed Electronically by: Fredi Spears MD

## 2025-01-07 NOTE — LETTER
January 7, 2025      Graeme Gamez  [address] [place] [address]        To Whom It May Concern,         Patient Graeme Gamez had his license revoked on 1/5/25 as a result of a disability parking certificate error.  This form was completed to assist with management of his ACL injury, for which he is to undergo surgical correction on 1/10/25.  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  Please provide us with the appropriate documentation needed for license reinstatement.        Sincerely,        Fredi Spears MD    Electronically signed

## 2025-01-09 ENCOUNTER — DOCUMENTATION ONLY (OUTPATIENT)
Dept: ANTICOAGULATION | Facility: CLINIC | Age: 40
End: 2025-01-09
Payer: COMMERCIAL

## 2025-01-09 NOTE — PROGRESS NOTES
Anticoagulant Therapeutic Duplication    Duplicate orders identified: same medication but different dose, form, frequency or route    Duplicate orders appropriate-has starter pack order and ongoing therapy order     Active anticoagulant: rivaroxaban (Xarelto)    Plan made per [facility] anticoagulation protocol.    Obie Matos RN  1/9/2025

## 2025-03-14 ENCOUNTER — OFFICE VISIT (OUTPATIENT)
Dept: FAMILY MEDICINE | Facility: CLINIC | Age: 40
End: 2025-03-14
Payer: COMMERCIAL

## 2025-03-14 VITALS
RESPIRATION RATE: 16 BRPM | WEIGHT: 230.6 LBS | DIASTOLIC BLOOD PRESSURE: 81 MMHG | BODY MASS INDEX: 31.23 KG/M2 | HEIGHT: 72 IN | HEART RATE: 62 BPM | TEMPERATURE: 97.9 F | SYSTOLIC BLOOD PRESSURE: 115 MMHG | OXYGEN SATURATION: 98 %

## 2025-03-14 DIAGNOSIS — H93.12 TINNITUS, LEFT: Primary | ICD-10-CM

## 2025-03-14 PROCEDURE — 3074F SYST BP LT 130 MM HG: CPT | Performed by: FAMILY MEDICINE

## 2025-03-14 PROCEDURE — 3079F DIAST BP 80-89 MM HG: CPT | Performed by: FAMILY MEDICINE

## 2025-03-14 PROCEDURE — G2211 COMPLEX E/M VISIT ADD ON: HCPCS | Performed by: FAMILY MEDICINE

## 2025-03-14 PROCEDURE — 99214 OFFICE O/P EST MOD 30 MIN: CPT | Performed by: FAMILY MEDICINE

## 2025-03-14 NOTE — PROGRESS NOTES
Assessment & Plan       ICD-10-CM    1. Tinnitus, left  H93.12 Adult ENT  Referral                The longitudinal plan of care for the diagnosis(es)/condition(s) as documented were addressed during this visit. Due to the added complexity in care, I will continue to support Saúl in the subsequent management and with ongoing continuity of care.     A total of 30 minutes spent face-to-face with greater than 50% of the time spent in counseling and coordinating cares of the issues above   There are no Patient Instructions on file for this visit.    Subjective   Saúl is a 40 year old, presenting for the following health issues:  Ringing in ear (Left)      3/14/2025     2:46 PM   Additional Questions   Roomed by Tiara   Accompanied by none         3/14/2025     2:46 PM   Patient Reported Additional Medications   Patient reports taking the following new medications none     History of Present Illness       Reason for visit:  Frequent Ringing in my ears  Symptom onset:  More than a month  Symptoms include:  Every couple of days i hear loud ringing in my ear (mostly left) for 3 to 5 seconds and it goes away.  Its happening with more frequency.  Symptom intensity:  Mild  Symptom progression:  Staying the same  Had these symptoms before:  No  What makes it worse:  Drinking or uaing thc  What makes it better:  N/a   He is taking medications regularly.      Tinnitis  Woke up to ear ringing in December  Louder episodes happening more frequently  No association  More often in left than right  High pitch  No dizziness              Review of Systems  Constitutional, HEENT, cardiovascular, pulmonary, gi and gu systems are negative, except as otherwise noted.      Objective    /81   Pulse 62   Temp 97.9  F (36.6  C) (Temporal)   Resp 16   Ht 1.829 m (6')   Wt 104.6 kg (230 lb 9.6 oz)   SpO2 98%   BMI 31.27 kg/m    Body mass index is 31.27 kg/m .  Physical Exam  Constitutional:       General: He is not in acute  distress.     Appearance: Normal appearance. He is well-developed. He is not ill-appearing.   HENT:      Head: Normocephalic and atraumatic.      Right Ear: External ear normal.      Left Ear: External ear normal.      Nose: Nose normal.   Eyes:      General: No scleral icterus.     Extraocular Movements: Extraocular movements intact.      Conjunctiva/sclera: Conjunctivae normal.   Cardiovascular:      Rate and Rhythm: Normal rate.   Pulmonary:      Effort: Pulmonary effort is normal.   Musculoskeletal:      Cervical back: Normal range of motion and neck supple.   Skin:     General: Skin is warm and dry.   Neurological:      Mental Status: He is alert and oriented to person, place, and time.   Psychiatric:         Behavior: Behavior normal.         Thought Content: Thought content normal.         Judgment: Judgment normal.                    Signed Electronically by: Fredi Spears MD

## 2025-05-11 ENCOUNTER — HOSPITAL ENCOUNTER (OUTPATIENT)
Dept: MRI IMAGING | Facility: CLINIC | Age: 40
Discharge: HOME | End: 2025-05-11
Attending: FAMILY MEDICINE | Admitting: FAMILY MEDICINE
Payer: COMMERCIAL

## 2025-05-11 DIAGNOSIS — H53.8 BLURRED VISION: ICD-10-CM

## 2025-05-11 DIAGNOSIS — R42 DIZZINESS: ICD-10-CM

## 2025-05-11 DIAGNOSIS — R42 VERTIGO: ICD-10-CM

## 2025-05-11 PROCEDURE — 70551 MRI BRAIN STEM W/O DYE: CPT

## 2025-05-14 ENCOUNTER — RESULTS FOLLOW-UP (OUTPATIENT)
Dept: FAMILY MEDICINE | Facility: CLINIC | Age: 40
End: 2025-05-14

## 2025-05-19 ENCOUNTER — THERAPY VISIT (OUTPATIENT)
Dept: PHYSICAL THERAPY | Facility: CLINIC | Age: 40
End: 2025-05-19
Attending: FAMILY MEDICINE
Payer: COMMERCIAL

## 2025-05-19 DIAGNOSIS — H53.8 BLURRED VISION: ICD-10-CM

## 2025-05-19 DIAGNOSIS — R42 VERTIGO: ICD-10-CM

## 2025-05-19 DIAGNOSIS — R42 DIZZINESS: ICD-10-CM

## 2025-05-19 PROCEDURE — 97535 SELF CARE MNGMENT TRAINING: CPT | Mod: GP | Performed by: PHYSICAL THERAPIST

## 2025-05-19 PROCEDURE — 97112 NEUROMUSCULAR REEDUCATION: CPT | Mod: GP | Performed by: PHYSICAL THERAPIST

## 2025-05-19 PROCEDURE — 97161 PT EVAL LOW COMPLEX 20 MIN: CPT | Mod: GP | Performed by: PHYSICAL THERAPIST

## 2025-05-19 NOTE — PROGRESS NOTES
PHYSICAL THERAPY EVALUATION  Type of Visit: Evaluation       Fall Risk Screen:  Have you fallen 2 or more times in the past year?: No  Have you fallen and had an injury in the past year?: No  Is patient receiving Physical Therapy Services?: Yes  Fall screen comments: Patient is not at increased risk of falling per gait and balance assessments    Subjective         Presenting condition or subjective complaint: Tinnitus, blurred vision at times, some disorientation  Date of onset: 05/02/25 (date of referral)    Relevant medical history: Dizziness; DVT (blood clot); Vision problems   Dates & types of surgery: 1/10 acl repair    Prior diagnostic imaging/testing results: MRI     Prior therapy history for the same diagnosis, illness or injury: No      Prior Level of Function  Transfers: Independent  Ambulation: Independent  ADL: Independent  IADL: Driving, Finances, Housekeeping, Laundry, Meal preparation, Work    Living Environment  Social support: With a significant other or spouse   Type of home: House   Stairs to enter the home: Yes 2 Is there a railing: Yes     Ramp: No   Stairs inside the home: Yes 2 Is there a railing: Yes     Help at home: None  Equipment owned: [employer]     Employment: Yes [employer]  Hobbies/Interests: Board rian, gym, leisure sports    Patient goals for therapy: I can do most things, but just get back to normal    Pain assessment: Pain denied     Objective        VESTIBULAR EVALUATION  ADDITIONAL HISTORY:  Description of symptoms: Constant dizziness; Blurry or jumping vision. In December, he got loud ringing in his right ear, woke him up. He can now hear ringing when it is quiet- all the time. ACL surgery in January, going to PT. At the end of March, he was feeling unwell and dizzy, disoriented, off. At night, he notices blurry vision reading his phone without his glasses, also when driving looking at lights. He also reports more headaches the past couple of weeks, low pain  concentrated in one area of his head (parietal). Has had 2 migraines in his life, triggered by weather change. Dizziness seems to be improving. He has an ENT appointment at the end of the month. Pt denies room spinning or nausea. Pt denies symptoms with bed mobility. Urgent care diagnosed him with BPPV twice, epley didn't seem to help. His mother has had BPPV. He goes into work 1x/week, that is going ok. He has avoid going out to socialize due to symptoms.   Dizzy attacks:   Start: March 2025   Last attack: 5/14   Frequency of occurrences: Every other day   Length of attack: Most of the day  Difficulty hearing:  sounds like he is in a tunnel when the loud ringing happens  Noise in ears? Yes Ringing when everything is silent and sometimes a loud ring that slowly dissipates  Alleviates symptoms: Putting on some kind of noise otherwise unsure  Worsens symptoms: Unsure  Activities that bring on symptoms: Bending over; Turning while walking       Pertinent visual history: wears glasses, last eye exam was 1-2 years ago  Pertinent history of current vestibular problem: Migraines, Motion sickness  DHI: Total Score: (Patient-Rptd) 26    Cognitive Status Examination  Orientation: Oriented to person, place and time   Level of Consciousness: Alert  Memory: Intact    RANGE OF MOTION: LE ROM WFL    BED MOBILITY: WNL    TRANSFERS: WNL    GAIT:   Level of [place]: Independent  Assistive Device(s): None  Gait Deviations: decreased R knee flexion from ACL R    BALANCE: impaired dynamic balance during gait with head turns and eyes closed    SPECIAL TESTS  Functional Gait Assessment (FGA) TOTAL SCORE: (MAXIMUM SCORE 30): 22    10 Meter Walk Test (Comfortable)  1.19 m/s   6 Minute Walk Test (6MWT)           5 Times Sit-to-Stand (5TSTS)       Dynamic Gait Index (DGI)     Modified CTSIB Conditions (sec) Cond 1:   Cond 2:   Cond 4:   Cond 5 :    Romberg  (sec)    Sharpened Romberg (sec)    Single Leg Stance Left (sec)    Single Leg  "Stance Right (sec)      COORDINATION: JEREMIE LE/UEs WNLs      Cervicogenic Screen    Neck ROM Normal   Vertebral Artery Test    Alar Ligament Test    Transverse Ligament Test    Distraction    Neck Torsion Test (head still, body rotating)    Neck Torsion Test (head and body rotating)         Oculomotor Screen    Ocular ROM Normal   Smooth Pursuit Normal   Saccades Challenging, some overshooting and sxs   VOR Normal   VOR Cancellation Normal with mild blurry vision   Head Impulse Test Normal   Convergence Testing Normal        Infrared Goggle Exam Vestibular Suppressant in Last 24 Hours? No  Exam Completed With: Infrared goggles   Spontaneous Nystagmus Negative   Gaze Evoked Nystagmus Negative   Head Shake Horizontal Nystagmus Negative but pt reports 3/10 dizziness    Positional Testing    Supine Head-Hanging Test     Left Right   [place]-Hallpike Negative but mild sxs after RTS Negative but mild sxs after RTS   Sidelying Test     [facility] Supine Roll Test Negative Negative   [facility] Forward Roll Test     [place] and Lean Test -  Sitting Erect    [place] and Lean Test - Seated, Head Bent 60 Degrees Forward    [place] and Lean Test - Seated, Head Bent Backwards       BPPV Canal(s):   BPPV Type:     Dynamic Visual Acuity (DVA)    Static Acuity (LogMar) 11   Horizontal Head Movement at 1 Hz (LogMar)    Horizontal Head Movement at 2 Hz (LogMar) 5   Abnormal 6 line difference, 3/10 \"off\" feeling. Seated, [place] small chart, 10' away      Assessment & Plan   CLINICAL IMPRESSIONS  Medical Diagnosis: Dizziness (R42)  Blurred vision (H53.8)  Vertigo (R42)    Treatment Diagnosis: Dizziness   Impression/Assessment: Patient is a 40 year old male with dizziness, disoriented feeling, blurriness, imbalance, tinnitus, and headache complaints.  The following significant findings have been identified: Impaired balance and Dizziness. These impairments interfere with their ability to perform work tasks and recreational activities as compared to previous level of " function.   Testing for BPPV was negative. FGA does not indicate patient is at increased risk of fall. 6 line abnormal DVA, difficulty during gait with head turns, and gait with eyes closed indicates possible peripheral vestibular deficit. Recommend skilled PT interventions in order to reduce symptoms and return to PLOF.     Clinical Decision Making (Complexity):  Clinical Presentation: Stable/Uncomplicated  Clinical Presentation Rationale: based on medical and personal factors listed in PT evaluation  Clinical Decision Making (Complexity): Low complexity    PLAN OF CARE  Treatment Interventions:  Interventions: Gait Training, Manual Therapy, Neuromuscular Re-education, Therapeutic Activity, Therapeutic Exercise, Self-Care/Home Management, Standardized Testing    Long Term Goals     PT Goal 1  Goal Identifier: DHI  Goal Description: Patient will improve score on DHI by 18 points in order to indicate minimum clinically significant improvement in reported symptoms during daily activities  Rationale: to maximize safety and independence with performance of ADLs and functional tasks;to maximize safety and independence within the home;to maximize safety and independence with self cares;to maximize safety and independence within the community  Goal Progress: Eval: 26/100  Target Date: 07/14/25  PT Goal 2  Goal Identifier: DVA  Goal Description: Patient will complete DVA testing with a loss of 3 lines or less between static and 2 Hz head movement and no exacerbation of dizziness to demonstrate improved gaze stability for return to activities without limitation.  Rationale: to maximize safety and independence with performance of ADLs and functional tasks;to maximize safety and independence within the home;to maximize safety and independence with self cares  Goal Progress: Eval: 6 line difference with 3/10 sxs  Target Date: 07/14/25  PT Goal 3  Goal Identifier: HEP  Goal Description: Patient will demonstrate understanding and  compliance to her HEP at least 3x per week for continued wellbeing upon discharge from skilled physical therapy.  Rationale: to maximize safety and independence with performance of ADLs and functional tasks;to maximize safety and independence within the home;to maximize safety and independence within the community;to maximize safety and independence with self cares  Goal Progress: Eval: initiated HEP  Target Date: 07/14/25      Frequency of Treatment: 1x per week, decreasing in frequency as indicated  Duration of Treatment: 8 weeks    Recommended Referrals to Other Professionals: VNG testing at ENT  Education Assessment:   Learner/Method: Patient;Demonstration;Pictures/Video  Education Comments: Exam findings, PT POC, HEP    Risks and benefits of evaluation/treatment have been explained.   Patient/Family/caregiver agrees with Plan of Care.     Evaluation Time:     PT Eval, Low Complexity Minutes (74153): 30       Signing Clinician: Dia David, PT

## 2025-06-16 ENCOUNTER — MYC MEDICAL ADVICE (OUTPATIENT)
Dept: OTOLARYNGOLOGY | Facility: CLINIC | Age: 40
End: 2025-06-16
Payer: COMMERCIAL

## 2025-06-16 DIAGNOSIS — R41.89 BRAIN FOG: ICD-10-CM

## 2025-06-16 DIAGNOSIS — R42 DIZZINESS: Primary | ICD-10-CM

## 2025-06-23 NOTE — TELEPHONE ENCOUNTER
Please contact patient.    has per unread MyChart message, given his persistent symptoms I think more in-depth balance testing with our audiology department is indicated.  I have placed this order and they should contact him to schedule.    Edilberot Fontenot PA-C

## 2025-06-23 NOTE — TELEPHONE ENCOUNTER
Writer attempted to call pt at number listed regarding referral made by Narciso Fontenot PA-C to audiology for balance testing. No answer. Left detailed message on pt secure voicemail with referral information and phone number for scheduling. Also sent [employer] message with information.       Jenn Soto RN on 6/23/2025 at 12:04 PM

## 2025-06-24 ENCOUNTER — PATIENT OUTREACH (OUTPATIENT)
Dept: CARE COORDINATION | Facility: CLINIC | Age: 40
End: 2025-06-24
Payer: COMMERCIAL

## 2025-06-26 ENCOUNTER — PATIENT OUTREACH (OUTPATIENT)
Dept: CARE COORDINATION | Facility: CLINIC | Age: 40
End: 2025-06-26